# Patient Record
Sex: FEMALE | Race: BLACK OR AFRICAN AMERICAN | Employment: FULL TIME
[De-identification: names, ages, dates, MRNs, and addresses within clinical notes are randomized per-mention and may not be internally consistent; named-entity substitution may affect disease eponyms.]

---

## 2023-08-16 ENCOUNTER — APPOINTMENT (OUTPATIENT)
Facility: HOSPITAL | Age: 53
End: 2023-08-16
Payer: COMMERCIAL

## 2023-08-16 ENCOUNTER — HOSPITAL ENCOUNTER (EMERGENCY)
Facility: HOSPITAL | Age: 53
Discharge: HOME | End: 2023-08-16
Payer: COMMERCIAL

## 2023-08-16 VITALS
SYSTOLIC BLOOD PRESSURE: 130 MMHG | RESPIRATION RATE: 15 BRPM | DIASTOLIC BLOOD PRESSURE: 80 MMHG | WEIGHT: 140 LBS | TEMPERATURE: 98.3 F | BODY MASS INDEX: 23.32 KG/M2 | OXYGEN SATURATION: 98 % | HEIGHT: 65 IN | HEART RATE: 97 BPM

## 2023-08-16 DIAGNOSIS — S80.02XA CONTUSION OF LEFT KNEE, INITIAL ENCOUNTER: Primary | ICD-10-CM

## 2023-08-16 PROCEDURE — 99283 EMERGENCY DEPT VISIT LOW MDM: CPT

## 2023-08-16 PROCEDURE — 73562 X-RAY EXAM OF KNEE 3: CPT

## 2023-08-16 ASSESSMENT — PAIN SCALES - GENERAL: PAINLEVEL_OUTOF10: 5

## 2023-08-16 ASSESSMENT — LIFESTYLE VARIABLES
HOW MANY STANDARD DRINKS CONTAINING ALCOHOL DO YOU HAVE ON A TYPICAL DAY: PATIENT DOES NOT DRINK
HOW OFTEN DO YOU HAVE A DRINK CONTAINING ALCOHOL: NEVER

## 2023-08-16 ASSESSMENT — PAIN - FUNCTIONAL ASSESSMENT: PAIN_FUNCTIONAL_ASSESSMENT: 0-10

## 2023-08-16 NOTE — ED PROVIDER NOTES
[facility] EMERGENCY DEPT  EMERGENCY DEPARTMENT HISTORY AND PHYSICAL EXAM      Date: 8/16/2023  Patient Name: Ericka Sawyer  MRN: [id number]  [address]: 1970  Date of evaluation: 8/16/2023  Provider: Bertrand Fan PA-C   Note Started: 5:11 PM EDT 8/16/23    HISTORY OF PRESENT ILLNESS     Chief Complaint   Patient presents with    Knee Injury       History Provided By: Patient    HPI: Ericka Sawyer is a 48 y.o. female no relevant past medical history presents emergency department for evaluation of left knee injury. 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 Patient denies any open wound or obvious deformity denies falling and hitting her head. States that she initially wanted to follow-up the next day but states that a coworker encouraged her to go and get evaluated. denies any fevers, chills, night sweats, photophobia, sore throat, nausea, vomiting, chest pain, shortness of breath, change in bowel or bladder habits      PAST MEDICAL HISTORY   Past Medical History:  History reviewed. No pertinent past medical history. Past Surgical History:  History reviewed. No pertinent surgical history. Family History:  History reviewed. No pertinent family history. Social History: Allergies: Allergies   Allergen Reactions    Latex Itching and Rash    Lidocaine Swelling    Procaine Swelling       PCP: No primary care provider on file. Current Meds:   No current facility-administered medications for this encounter. No current outpatient medications on file.        Social Determinants of Health:   Social Determinants of Health     Tobacco Use: Not on file   Alcohol Use: Not At Risk    Frequency of Alcohol Consumption: Never    Average Number of Drinks: Patient does not drink    Frequency of Binge Drinking: Never   Financial Resource Strain: Not on file   Food Insecurity: Not on file   Transportation Needs: Not on

## 2023-08-16 NOTE — ED NOTES
Pt understanding of dc instructions and followup care ,alert oriented and ambulatory at discharge     Mir Ashraf RN  08/16/23 [id number]